# Patient Record
Sex: MALE | Race: WHITE
[De-identification: names, ages, dates, MRNs, and addresses within clinical notes are randomized per-mention and may not be internally consistent; named-entity substitution may affect disease eponyms.]

---

## 2019-07-08 ENCOUNTER — HOSPITAL ENCOUNTER (EMERGENCY)
Dept: HOSPITAL 74 - JER | Age: 54
Discharge: HOME | End: 2019-07-08
Payer: COMMERCIAL

## 2019-07-08 VITALS — HEART RATE: 83 BPM | SYSTOLIC BLOOD PRESSURE: 121 MMHG | DIASTOLIC BLOOD PRESSURE: 80 MMHG

## 2019-07-08 VITALS — TEMPERATURE: 98.8 F

## 2019-07-08 VITALS — BODY MASS INDEX: 37.5 KG/M2

## 2019-07-08 DIAGNOSIS — R10.32: Primary | ICD-10-CM

## 2019-07-08 DIAGNOSIS — F17.210: ICD-10-CM

## 2019-07-08 LAB
ALBUMIN SERPL-MCNC: 3.8 G/DL (ref 3.4–5)
ALP SERPL-CCNC: 105 U/L (ref 45–117)
ALT SERPL-CCNC: 28 U/L (ref 13–61)
ANION GAP SERPL CALC-SCNC: 8 MMOL/L (ref 8–16)
APPEARANCE UR: (no result)
AST SERPL-CCNC: 67 U/L (ref 15–37)
BACTERIA # UR AUTO: 0.5 /HPF
BASOPHILS # BLD: 0.6 % (ref 0–2)
BILIRUB SERPL-MCNC: 1 MG/DL (ref 0.2–1)
BILIRUB UR STRIP.AUTO-MCNC: NEGATIVE MG/DL
BUN SERPL-MCNC: 14.7 MG/DL (ref 7–18)
CALCIUM SERPL-MCNC: 8.9 MG/DL (ref 8.5–10.1)
CASTS URNS QL MICRO: 2 /LPF (ref 0–8)
CHLORIDE SERPL-SCNC: 103 MMOL/L (ref 98–107)
CO2 SERPL-SCNC: 23 MMOL/L (ref 21–32)
COLOR UR: YELLOW
CREAT SERPL-MCNC: 1.5 MG/DL (ref 0.55–1.3)
DEPRECATED RDW RBC AUTO: 12.8 % (ref 11.9–15.9)
EOSINOPHIL # BLD: 0.5 % (ref 0–4.5)
EPITH CASTS URNS QL MICRO: 0.2 /HPF
GLUCOSE SERPL-MCNC: 183 MG/DL (ref 74–106)
HCT VFR BLD CALC: 42.8 % (ref 35.4–49)
HGB BLD-MCNC: 15 GM/DL (ref 11.7–16.9)
KETONES UR QL STRIP: (no result)
LEUKOCYTE ESTERASE UR QL STRIP.AUTO: NEGATIVE
LIPASE SERPL-CCNC: 216 U/L (ref 73–393)
LYMPHOCYTES # BLD: 17.8 % (ref 8–40)
MCH RBC QN AUTO: 31.5 PG (ref 25.7–33.7)
MCHC RBC AUTO-ENTMCNC: 34.9 G/DL (ref 32–35.9)
MCV RBC: 90.3 FL (ref 80–96)
MONOCYTES # BLD AUTO: 7.1 % (ref 3.8–10.2)
NEUTROPHILS # BLD: 74 % (ref 42.8–82.8)
NITRITE UR QL STRIP: NEGATIVE
PH UR: 5.5 [PH] (ref 5–8)
PLATELET # BLD AUTO: 238 K/MM3 (ref 134–434)
PMV BLD: 8.2 FL (ref 7.5–11.1)
PROT SERPL-MCNC: 7.4 G/DL (ref 6.4–8.2)
PROT UR QL STRIP: (no result)
PROT UR QL STRIP: NEGATIVE
RBC # BLD AUTO: 19 /HPF (ref 0–4)
RBC # BLD AUTO: 4.74 M/MM3 (ref 4–5.6)
SODIUM SERPL-SCNC: 134 MMOL/L (ref 136–145)
SP GR UR: 1.04 (ref 1.01–1.03)
UROBILINOGEN UR STRIP-MCNC: 0.2 MG/DL (ref 0.2–1)
WBC # BLD AUTO: 13.8 K/MM3 (ref 4–10)
WBC # UR AUTO: 2 /HPF (ref 0–5)

## 2019-07-08 PROCEDURE — 3E033GC INTRODUCTION OF OTHER THERAPEUTIC SUBSTANCE INTO PERIPHERAL VEIN, PERCUTANEOUS APPROACH: ICD-10-PCS | Performed by: STUDENT IN AN ORGANIZED HEALTH CARE EDUCATION/TRAINING PROGRAM

## 2019-07-08 PROCEDURE — 3E0333Z INTRODUCTION OF ANTI-INFLAMMATORY INTO PERIPHERAL VEIN, PERCUTANEOUS APPROACH: ICD-10-PCS | Performed by: STUDENT IN AN ORGANIZED HEALTH CARE EDUCATION/TRAINING PROGRAM

## 2019-07-08 PROCEDURE — 3E0337Z INTRODUCTION OF ELECTROLYTIC AND WATER BALANCE SUBSTANCE INTO PERIPHERAL VEIN, PERCUTANEOUS APPROACH: ICD-10-PCS | Performed by: STUDENT IN AN ORGANIZED HEALTH CARE EDUCATION/TRAINING PROGRAM

## 2019-07-08 PROCEDURE — 3E033NZ INTRODUCTION OF ANALGESICS, HYPNOTICS, SEDATIVES INTO PERIPHERAL VEIN, PERCUTANEOUS APPROACH: ICD-10-PCS | Performed by: STUDENT IN AN ORGANIZED HEALTH CARE EDUCATION/TRAINING PROGRAM

## 2019-07-09 ENCOUNTER — HOSPITAL ENCOUNTER (OUTPATIENT)
Dept: HOSPITAL 74 - JER | Age: 54
Discharge: HOME | End: 2019-07-09
Attending: INTERNAL MEDICINE
Payer: COMMERCIAL

## 2019-07-09 VITALS — BODY MASS INDEX: 37.5 KG/M2

## 2019-07-09 VITALS — TEMPERATURE: 98 F | HEART RATE: 82 BPM | DIASTOLIC BLOOD PRESSURE: 81 MMHG | SYSTOLIC BLOOD PRESSURE: 156 MMHG

## 2019-07-09 DIAGNOSIS — R31.0: ICD-10-CM

## 2019-07-09 DIAGNOSIS — N13.30: ICD-10-CM

## 2019-07-09 DIAGNOSIS — N20.1: Primary | ICD-10-CM

## 2019-07-09 LAB
ALBUMIN SERPL-MCNC: 4 G/DL (ref 3.4–5)
ALP SERPL-CCNC: 101 U/L (ref 45–117)
ALT SERPL-CCNC: 23 U/L (ref 13–61)
ANION GAP SERPL CALC-SCNC: 7 MMOL/L (ref 8–16)
APPEARANCE UR: CLEAR
AST SERPL-CCNC: 13 U/L (ref 15–37)
BACTERIA # UR AUTO: 3 /HPF
BASOPHILS # BLD: 1 % (ref 0–2)
BILIRUB SERPL-MCNC: 0.6 MG/DL (ref 0.2–1)
BILIRUB UR STRIP.AUTO-MCNC: NEGATIVE MG/DL
BUN SERPL-MCNC: 14.4 MG/DL (ref 7–18)
CALCIUM SERPL-MCNC: 8.9 MG/DL (ref 8.5–10.1)
CASTS URNS QL MICRO: 1 /LPF (ref 0–8)
CHLORIDE SERPL-SCNC: 104 MMOL/L (ref 98–107)
CO2 SERPL-SCNC: 27 MMOL/L (ref 21–32)
COLOR UR: YELLOW
CREAT SERPL-MCNC: 1.4 MG/DL (ref 0.55–1.3)
DEPRECATED RDW RBC AUTO: 12.8 % (ref 11.9–15.9)
EOSINOPHIL # BLD: 1.5 % (ref 0–4.5)
EPITH CASTS URNS QL MICRO: 0.3 /HPF
GLUCOSE SERPL-MCNC: 142 MG/DL (ref 74–106)
HCT VFR BLD CALC: 43.1 % (ref 35.4–49)
HGB BLD-MCNC: 14.5 GM/DL (ref 11.7–16.9)
INR BLD: 1.02 (ref 0.83–1.09)
KETONES UR QL STRIP: (no result)
LEUKOCYTE ESTERASE UR QL STRIP.AUTO: NEGATIVE
LYMPHOCYTES # BLD: 25.1 % (ref 8–40)
MCH RBC QN AUTO: 30.7 PG (ref 25.7–33.7)
MCHC RBC AUTO-ENTMCNC: 33.7 G/DL (ref 32–35.9)
MCV RBC: 91.2 FL (ref 80–96)
MONOCYTES # BLD AUTO: 7.9 % (ref 3.8–10.2)
NEUTROPHILS # BLD: 64.5 % (ref 42.8–82.8)
NITRITE UR QL STRIP: NEGATIVE
PH UR: 5.5 [PH] (ref 5–8)
PLATELET # BLD AUTO: 244 K/MM3 (ref 134–434)
PMV BLD: 7.2 FL (ref 7.5–11.1)
POTASSIUM SERPLBLD-SCNC: 4.1 MMOL/L (ref 3.5–5.1)
PROT SERPL-MCNC: 6.9 G/DL (ref 6.4–8.2)
PROT UR QL STRIP: (no result)
PROT UR QL STRIP: (no result)
PT PNL PPP: 12 SEC (ref 9.7–13)
RBC # BLD AUTO: 123 /HPF (ref 0–4)
RBC # BLD AUTO: 4.72 M/MM3 (ref 4–5.6)
SODIUM SERPL-SCNC: 139 MMOL/L (ref 136–145)
SP GR UR: 1.02 (ref 1.01–1.03)
UROBILINOGEN UR STRIP-MCNC: 0.2 MG/DL (ref 0.2–1)
WBC # BLD AUTO: 14 K/MM3 (ref 4–10)
WBC # UR AUTO: 1 /HPF (ref 0–5)

## 2019-07-09 PROCEDURE — 0T778DZ DILATION OF LEFT URETER WITH INTRALUMINAL DEVICE, VIA NATURAL OR ARTIFICIAL OPENING ENDOSCOPIC: ICD-10-PCS | Performed by: UROLOGY

## 2019-07-09 PROCEDURE — 0TF78ZZ FRAGMENTATION IN LEFT URETER, VIA NATURAL OR ARTIFICIAL OPENING ENDOSCOPIC: ICD-10-PCS | Performed by: UROLOGY

## 2019-07-09 PROCEDURE — BT1FYZZ FLUOROSCOPY OF LEFT KIDNEY, URETER AND BLADDER USING OTHER CONTRAST: ICD-10-PCS | Performed by: UROLOGY

## 2019-07-09 NOTE — PDOC
History of Present Illness





- General


Chief Complaint: Pain, Acute


Stated Complaint: LT LOWER ABD PAIN


Time Seen by Provider: 07/09/19 14:35


History Source: Patient


Exam Limitations: No Limitations





Past History





- Travel


Traveled outside of the country in the last 30 days: No


Close contact w/someone who was outside of country & ill: No





- Past Medical History


Allergies/Adverse Reactions: 


 Allergies











Allergy/AdvReac Type Severity Reaction Status Date / Time


 


No Known Allergies Allergy   Verified 07/09/19 13:46











Home Medications: 


Ambulatory Orders





Cefdinir [Omnicef -] 300 mg PO BID 07/08/19 


Ondansetron HCl [Zofran] 4 mg PO ASDIR 07/08/19 


Tamsulosin HCl [Flomax] 0.4 mg PO DAILY #7 capsule 07/08/19 


Tramadol HCl 50 mg PO QID PRN #12 tablet MDD 4 07/08/19 








Anemia: No


Asthma: No


Cancer: No


Cardiac Disorders: No


CVA: No


COPD: No


CHF: No


DVT: No


Dementia: No


Diabetes: No


Dialysis: No


GI Disorders: Yes ( see history of present illness)


 Disorders: No


HTN: No


Hypercholesterolemia: No


Kidney Stones: No


Liver Disease: No


Psychiatric Problems: No


Seizures: No


Thyroid Disease: No


Lung CA: No





- Surgical History


Abdominal Surgery: Yes ( umbilical hernia repair)


Appendectomy: Yes


Cardiac Surgery: No


Cholecystectomy: No


Gastric Stapling: No


GI Surgery: No


Lung Surgery: No


Neurologic Surgery: No


Orthopedic Surgery: No





- Suicide/Smoking/Psychosocial Hx


Smoking Status: Yes


Smoking History: Current every day smoker


Years of Tobacco Use: 30


Have you smoked in the past 12 months: Yes


Number of Cigarettes Smoked Daily: 20


Information on smoking cessation initiated: No


'Breaking Loose' booklet given: 09/05/12


Hx Alcohol Use: No


Drug/Substance Use Hx: No


Substance Use Type: Alcohol


Hx Substance Use Treatment: No





**Review of Systems





- Review of Systems


Able to Perform ROS?: Yes


Comments:: 





07/09/19 14:51


CONSTITUTIONAL: 


Absent: fever, chills, diaphoresis, generalized weakness, malaise, loss of 

appetite


HEENT: 


Absent: rhinorrhea, nasal congestion, throat pain, throat swelling, difficulty 

swallowing, mouth swelling, ear pain, eye pain, visual Changes


CARDIOVASCULAR: 


Absent: chest pain, loss of consciousness, palpitations, irregular heart rate, 

peripheral edema


RESPIRATORY: 


Absent: cough, shortness of breath, dyspnea with exertion, orthopnea, wheezing, 

stridor, hemoptysis


GASTROINTESTINAL:


Absent: abdominal pain, abdominal distension, nausea, vomiting, diarrhea, 

constipation, melena, hematochezia


GENITOURINARY: 


Present: L sided flank pain Absent: dysuria, frequency, urgency, hesitancy, 

hematuria, flank pain, genital pain


MUSCULOSKELETAL: 


Absent: myalgia, arthralgia, joint swelling


SKIN: 


Absent: rash, itching, pallor


HEMATOLOGIC/IMMUNOLOGIC: 


Absent: easy bleeding, easy bruising, lymphadenopathy, frequent infections


ENDOCRINE:


Absent: unexplained weight gain, unexplained weight loss, heat intolerance, 

cold intolerance


NEUROLOGIC: 


Absent: headache, focal weakness or paresthesias, dizziness, unsteady gait, 

seizure, mental status changes, bladder or bowel incontinence


PSYCHIATRIC: 


Absent: anxiety, depression, suicidal or homicidal ideation, hallucinations.


Is the patient limited English proficient: No





*Physical Exam





- Vital Signs


 Last Vital Signs











Temp Pulse Resp BP Pulse Ox


 


 98.5 F   77   15   154/99   100 


 


 07/09/19 13:46  07/09/19 13:46  07/09/19 13:46  07/09/19 13:46  07/09/19 13:46














- Physical Exam


Comments: 





07/09/19 14:53


GENERAL:


Well developed, well nourished. Awake and alert. No acute distress.


HEENT:


Normocephalic, atraumatic. PERRLA, EOMI. No conjunctival pallor. Sclera are non-

icteric. Moist mucous membranes. Oropharynx is clear.


NECK: 


Supple. Full ROM. No JVD. Carotid pulses 2+ and symmetric, without bruits. No 

thyromegaly. No lymphadenopathy.


CARDIOVASCULAR:


Regular rate and rhythm. No murmurs, rubs, or gallops. Distal pulses are 2+ and 

symmetric. 


PULMONARY: 


No evidence of respiratory distress. Lungs clear to auscultation bilaterally. 

No wheezing, rales or rhonchi.


ABDOMINAL:


L sided abdominal discomfort. Soft. Non-distended. No rebound or guarding. No 

organomegaly. Normoactive bowel sounds. 


MUSCULOSKELETAL 


Normal range of motion at all joints. No bony deformities or tenderness. (+) L 

sided CVA tenderness.


EXTREMITIES: 


No cyanosis. No clubbing. No edema. No calf tenderness.


SKIN: 


Warm and dry. Normal capillary refill. No rashes. No jaundice. 


NEUROLOGICAL: 


Alert, awake, appropriate. Cranial nerves 2-12 intact. No deficits to light 

touch and temperature in face, upper extremities and lower extremities. No 

motor deficits in the in face, upper extremities and lower extremities. 

Normoreflexic in the upper and lower extremities. Normal speech. Toes are down-

going bilaterally. Gait is normal without ataxia.


PSYCHIATRIC: 


Cooperative. Good eye contact. Appropriate mood and affect.





ED Treatment Course





- LABORATORY


CBC & Chemistry Diagram: 


 07/09/19 16:00





 07/09/19 16:00





Medical Decision Making





- Medical Decision Making





07/09/19 14:57


The patient is a 54-year-old male who presents to the ER today with left-sided 

flank pain. The patient states the symptoms of the going on since Thursday. He 

has been evaluated both [facility] and our facility for similar pain. He 

had a CAT scan yesterday that was negative for stones however he continues to 

have the pain. He was referred to Dr. Ricky Julien as an outpatient. He states that 

he saw Dr. Smith and was told to come back to the ER with Dr. Izquierdo is going 

to bring him to the OR for procedure. He states he still has the pain on the 

left side and rates it a 10 out of 10. He is not taking any medication at home 

for the pain. Admits to associated dysuria. Denies fevers, chills, chest pain, 

shortness of breath, vomiting.





A/P: Left-sided flank pain


On exam patient with CVA tenderness to the left. Patient appears restless and 

walking around exam room.


Images from yesterday were reviewed. 


Preop labs ordered


Dilaudid and Zofran ordered


Call placed to Dr. Ricky Julien office


Reevaluate





07/09/19 17:24


OR here to take pt 


Dr. Prabhjot Julien requesting hospital admission


PCP Dr. Danilo Gibbons --> hospitalists





*DC/Admit/Observation/Transfer


Diagnosis at time of Disposition: 


 Left flank pain








- Discharge Dispostion


Condition at time of disposition: Stable


Decision to Admit order: Yes





- Referrals





- Patient Instructions





- Post Discharge Activity

## 2019-07-09 NOTE — OP
Operative Note





- Note:


Operative Date: 07/09/19


Pre-Operative Diagnosis: left renal colic/gross hematuria


Operation: cystoscopy/left retrograde pyelogram/left ureteroscopic laser 

lithotripsy/left ureteroscopic stone basketing/left ureteral stent placement


Findings: 





stone fragments


Post-Operative Diagnosis: Other (left ureteral stone 6-8 mm with proximal 

hydronephrosis)


Surgeon: Efrain Garcia


Anesthesia: General


Specimens Removed: ureteral stones


Drains & Tubes with Location: 6/24 left ureteral stent

## 2019-07-09 NOTE — OP
DATE OF OPERATION:  07/09/2019

 

PREOPERATIVE DIAGNOSIS:  Left renal colic and gross hematuria.

 

POSTOPERATIVE DIAGNOSIS:  Left ureteral stone with hydronephrosis.

 

PROCEDURES:  Cystoscopy, left retrograde pyelogram, left ureteroscopic laser

lithotripsy, and left ureteroscopic stone basketing, and left ureteral stent

placement.

 

SURGEON:  Rosa Gonzalez MD 

 

ANESTHESIA:  General.

 

DESCRIPTION OF PROCEDURE:  The patient presented to the office today with severe left

colic.  The patient had 2 CAT scans performed within 24 hours at different

institutions, which showed no significant hydronephrosis or stones.  The patient was

noted at the office to have severe left colic with costovertebral angle tenderness

that measured roughly a 7/10-8/10.  The patient, in addition, was noted to have

significant microscopic hematuria.  The patient was scheduled for a left retrograde

pyelogram due to the suspiciousness of the symptoms.  The patient understands all

risks and benefits to the procedure.  The patient was brought in the operating room,

placed in a supine position on the operating room table.  Anesthesia and preoperative

antibiotics were administered.  The patient was then placed in a dorsal lithotomy

position and prepped and draped in the usual sterile manner.  Cystoscopy was

performed.  No evidence of neoplasm within the bladder is noted.  There are no stones

in the bladder.  At this point, a retrograde pyelogram was performed, which showed a

left hydronephrosis with multiple filling defects within the ureter.  At this point,

a wire was passed into the kidney, and ureteroscopy was performed.  A mid-ureteral 6-

to 8-mm stone was found.  Laser lithotripsy was performed to reduce the stone to

manageable fragments.  Once the lithotripsy was complete, fragments of the stone were

basketed under direct visualization via the ureteroscope and removed and sent for

pathologic evaluation.  There were no complications noted.  A 6-Setswana 24-cm stent

was left in the left kidney at the end of the case utilizing the Seldinger technique.

 No complications were noted.  The disposition of the patient was to recovery room.

 

 

ROSA GONZALEZ M.D.

 

[id number]

DD: 07/09/2019 18:51

DT: 07/09/2019 21:35

Job #:  [id number]

## 2019-07-10 NOTE — EKG
Test Reason : 

Blood Pressure : ***/*** mmHG

Vent. Rate : 079 BPM     Atrial Rate : 079 BPM

   P-R Int : 130 ms          QRS Dur : 090 ms

    QT Int : 380 ms       P-R-T Axes : 068 054 031 degrees

   QTc Int : 435 ms

 

NORMAL SINUS RHYTHM

NORMAL ECG

WHEN COMPARED WITH ECG OF 05-SEP-2012 13:26,

NO SIGNIFICANT CHANGE WAS FOUND

Confirmed by XIMENA HOGAN MD ([id number]) on 7/10/2019 8:30:41 AM

 

Referred By:             Confirmed By:XIMENA HOGAN MD

## 2019-07-11 NOTE — PATH
Surgical Pathology Report



Patient Name:  SHON PALMER

Accession #:  [id number]

Med. Rec. #:  [id number]                                                        

   /Age/Gender:  1965 (Age: 54) / M

Account:  [id number]                                                          

             Location: AMBULATORY SURG

Taken:  2019

Received:  7/10/2019

Reported:  2019

Physicians:  Efrain Garcia

  



Specimen(s) Received

 LEFT URETERAL STONES 





Clinical History

Left ureteral stones







Final Diagnosis

URETERAL STONES, LEFT, STONE BASKETING:

URETEROLITHIASIS. MACROSCOPIC DIAGNOSIS.





***Electronically Signed***

Liss Lowe M.D.





Gross Description

Received fresh labeled "left ureteral stones," are 3 tan, irregular calculi

ranging from 0.1-0.3 cm in greatest dimension. The specimen is sent for chemical

analysis.

/7/10/2019



saudi/7/10/2019

## 2019-07-17 LAB
BLD.DRIED MFR STONE: (no result) %
CA BILIRUB MFR STONE: (no result) %
CA CARBONATE MFR STONE: (no result) %
CA PHOS MFR STONE: (no result) %
CAHPO4 CRY STONE QL IR: (no result)
CELL MATERIAL STONE EST-MCNT: (no result) %
COM CRY STONE QL IR: (no result)
CYSTINE SPEC-SCNC: (no result) UMOL/L
URATE DIHYD MFR STONE: (no result) %
URATE STONE QL: 100 %
WT STONE: 6.3 MG

## 2022-08-09 NOTE — PDOC
History of Present Illness





- General


Chief Complaint: Pain, Acute


Stated Complaint: ABD PAIN


Time Seen by Provider: 07/08/19 09:38


History Source: Patient


Exam Limitations: No Limitations





- History of Present Illness


Travel History: No


Initial Comments: 





07/08/19 09:49


54y M no knonw pmhx presents with complaint of L flank pain. Pt notes pain 

started diffusely in the abdomen on friday, gradually got worse, went to 

[facility] an dwas worked up, had a CT with IV contrast that was neg and 

was discharged. pt notes the pain started radiating to the L flank while he was 

at the hospital. Pt was dc with some abx and percocet. pt notes the pain is 

constant, but is severe intermittently, associated with nausea without 

associated vomiting, fever/chills, dysuria, diarrhea, cp, sob, numbness/tingling

/weakness. Pt notes some 'muddy colored urine' yesterday but has cleared up 

today. no hx of kidney stones. denies any pain in the testicles or penis. no 

penile discharge











Past History





- Past Medical History


Allergies/Adverse Reactions: 


 Allergies











Allergy/AdvReac Type Severity Reaction Status Date / Time


 


No Known Allergies Allergy   Verified 07/08/19 08:42











Home Medications: 


Ambulatory Orders





Cefdinir [Omnicef -] 300 mg PO BID 07/08/19 


Ondansetron HCl [Zofran] 4 mg PO ASDIR 07/08/19 


Tamsulosin HCl [Flomax] 0.4 mg PO DAILY #7 capsule 07/08/19 


Tramadol HCl 50 mg PO QID PRN #12 tablet MDD 4 07/08/19 








Anemia: No


Asthma: No


Cancer: No


Cardiac Disorders: No


CVA: No


COPD: No


CHF: No


DVT: No


Dementia: No


Diabetes: No


Dialysis: No


GI Disorders: Yes ( see history of present illness)


 Disorders: No


HTN: No


Hypercholesterolemia: No


Kidney Stones: No


Liver Disease: No


Psychiatric Problems: No


Seizures: No


Thyroid Disease: No


Lung CA: No





- Surgical History


Abdominal Surgery: Yes ( umbilical hernia repair)


Appendectomy: Yes


Cardiac Surgery: No


Cholecystectomy: No


Gastric Stapling: No


GI Surgery: No


Lung Surgery: No


Neurologic Surgery: No


Orthopedic Surgery: No





- Suicide/Smoking/Psychosocial Hx


Smoking Status: Yes


Smoking History: Current every day smoker


Years of Tobacco Use: 30


Have you smoked in the past 12 months: Yes


Number of Cigarettes Smoked Daily: 20


Information on smoking cessation initiated: Yes


'Breaking Loose' booklet given: 09/05/12


Hx Alcohol Use: No


Drug/Substance Use Hx: No


Substance Use Type: Alcohol


Hx Substance Use Treatment: No





**Review of Systems





- Review of Systems


Able to Perform ROS?: Yes


Comments:: 





07/08/19 09:51


Constitutional - no reported Fever, Chills, 


HEENT: no reported vision changes, sore throat


Respiratory: no reported cough, sob, hemoptysis


Cardiac: no reported chest pain, palpitations, light headedness, leg swelling


Abd/GI: + nausea, flank pain no reported abd pain, vomiting, blood per rectum, 

melena, diarrhea


: +hematuria no reported dysuria, frequency, discharge


Musculskelatal - no reported back pain, joint swelling


skin - no reported bruising, erythema, rash


neurological: no reported headache, numbness, focal weakness, tingling, ataxia, 


hematologic: no reported  easy bruising, easy bleeding











*Physical Exam





- Vital Signs


 Last Vital Signs











Temp Pulse Resp BP Pulse Ox


 


 98.8 F   85   19   181/96 H  97 


 


 07/08/19 08:40  07/08/19 08:40  07/08/19 08:40  07/08/19 08:40  07/08/19 08:40














- Physical Exam


Comments: 





07/08/19 09:52





GENERAL: The patient is awake, alert, and fully oriented, Nontoxic - in no 

acute distress.


HEAD: Normocephalic, atraumatic.


EYES: extraocular movements intact, sclera anicteric, conjunctiva clear.


ENT: Normal voice,  Moist mucous membranes.


NECK: Normal range of motion, supple


LUNGS: Breath sounds equal, clear to auscultation bilaterally.  No wheezes, no 

rhonchi, no rales.


HEART: Regular rate and rhythm, normal S1 and S2 without murmur, rub or gallop.


ABDOMEN: mild LLQ ttp, mild L CVA tenderness, no rebgound/guarding


EXTREMITIES: Normal range of motion, no edema.  


NEUROLOGICAL: No facial assymetry, Normal speech, 


PSYCH: Normal mood, normal affect.


SKIN: Warm, Dry, normal turgor,








ED Treatment Course





- LABORATORY


CBC & Chemistry Diagram: 


 07/08/19 10:30





 07/08/19 10:30





Medical Decision Making





- Medical Decision Making





07/08/19 09:52


suspect kidney stones based on presentation


will obtain urine, labs


will obtain CT w/o contrast


07/08/19 13:09


ct reviewed 


noted for exoptic right renal lesion = will obtain formal kidney US


L renal pelvis contrast enhancing possible retrograde filling of L kidney vs 

excretion of IV contrast. ther eis some stranding around the left ureter - ddx 

- utheritis vs infiltrative process - 


will obtain US of kidneys and dw urology


pt still in significnt pain. will gvie anothe rdose of dilaudid





07/08/19 16:17


pts pain is controlled currently


but feeling nauseus, alice give reglan


will discuss with urology, if pain controlled, and able to toelrate oral intake 

will consider outpatiet management with o/p uro fu








07/08/19 17:18


case was discussed with Dr. Bojorquez the patient's pain is controlled recommend 

follow-up with him tomorrow as an outpatient.


patient is feeling improved on reassessment


we'll start the patient on Flomax for presumptive kidney stone.


i had a extensive discussion with the patient's family regarding his pain - 

consider possible kdiney stone obscured by contrast, consider possible 

constipation 


return precautions were discussed





I discussed the physical exam findings, ancillary test results and final 

diagnoses with the patient. I answered all of the patient's questions. The 

patient was satisfied with the care received and felt comfortable with the 

discharge plan and treatment plan. The patient will call their primary care 

physician within 24 hours to arrange follow-up and will return to the Emergency 

Department with any new, persistent or worsening symptoms. 








*DC/Admit/Observation/Transfer


Diagnosis at time of Disposition: 


 Left flank pain








- Discharge Dispostion


Disposition: HOME


Condition at time of disposition: Improved


Decision to Admit order: No





- Referrals


Referrals: 


Efrain Garcia MD [Staff Physician] - 





- Patient Instructions


Printed Discharge Instructions:  DI for Abdominal Pain-Adult


Additional Instructions: 


Return to the emergency department immediately with ANY new, persistent or 

worsening symptoms.





The cause of your abdominal pain is not absolutely clear. It may be attribuated 

to a kidney stone obscured by the contrast or may be related to constipation.  

Therw as also a small cyst on your kidney - follow this up with your primray 

care doctor. A copy of your ultrasound and CT results are included.





You MUST call and follow up with your primary care doctor and dr Ricky Julien 

tomorrow for further evaluation of your symptoms. Results were discussed with 

you. Please make sure your doctor reviews the results of your emergency 

evaluation. Your Emergency Department visit is not complete without a follow up 

with your doctor.








Print Language: ENGLISH





- Post Discharge Activity
unknown